# Patient Record
Sex: FEMALE | Race: WHITE
[De-identification: names, ages, dates, MRNs, and addresses within clinical notes are randomized per-mention and may not be internally consistent; named-entity substitution may affect disease eponyms.]

---

## 2018-07-12 ENCOUNTER — HOSPITAL ENCOUNTER (EMERGENCY)
Dept: HOSPITAL 97 - ER | Age: 43
Discharge: HOME | End: 2018-07-12
Payer: SELF-PAY

## 2018-07-12 DIAGNOSIS — R10.32: Primary | ICD-10-CM

## 2018-07-12 DIAGNOSIS — E11.9: ICD-10-CM

## 2018-07-12 DIAGNOSIS — E78.5: ICD-10-CM

## 2018-07-12 DIAGNOSIS — Z79.84: ICD-10-CM

## 2018-07-12 DIAGNOSIS — I10: ICD-10-CM

## 2018-07-12 LAB — UA COMPLETE W REFLEX CULTURE PNL UR: (no result)

## 2018-07-12 PROCEDURE — 87086 URINE CULTURE/COLONY COUNT: CPT

## 2018-07-12 PROCEDURE — 96372 THER/PROPH/DIAG INJ SC/IM: CPT

## 2018-07-12 PROCEDURE — 81003 URINALYSIS AUTO W/O SCOPE: CPT

## 2018-07-12 PROCEDURE — 81025 URINE PREGNANCY TEST: CPT

## 2018-07-12 PROCEDURE — 81015 MICROSCOPIC EXAM OF URINE: CPT

## 2018-07-12 PROCEDURE — 76830 TRANSVAGINAL US NON-OB: CPT

## 2018-07-12 PROCEDURE — 87088 URINE BACTERIA CULTURE: CPT

## 2018-07-12 PROCEDURE — 99284 EMERGENCY DEPT VISIT MOD MDM: CPT

## 2018-07-12 NOTE — ER
Nurse's Notes                                                                                     

 [facility]                                                                

Name: Ellyn Renee                                                                              

Age: 42 yrs                                                                                       

Sex: Female                                                                                       

: 1975                                                                                   

MRN: [id number]                                                                                   

Arrival Date: 2018                                                                          

Time: 13:46                                                                                       

Account#: [id number]                                                                            

Bed 13                                                                                            

Private MD: None, None                                                                            

Diagnosis: Lower abdominal pain, unspecified                                                      

                                                                                                  

Presentation:                                                                                     

                                                                                             

14:06 Presenting complaint: Patient states: Reports left lower quadrant pain for 2 months.    aj1 

      Patient has not seen her PHCP regarding this complaint. Denies N/V/D. Denies fever,         

      dysuria, vaginal bleeding or discharge. Transition of care: patient was not received        

      from another setting of care. Onset of symptoms was May 2018. Risk Assessment: Do you       

      want to hurt yourself or someone else? Patient reports no desire to harm self or            

      others. Initial Sepsis Screen: Does the patient meet any 2 criteria? No. Patient's          

      initial sepsis screen is negative. Does the patient have a suspected source of              

      infection? No. Patient's initial sepsis screen is negative. Care prior to arrival: None.    

14:06 Method Of Arrival: Ambulatory                                                           [facility] 

14:06 Acuity: ROMAN 3                                                                           aj 

                                                                                                  

Triage Assessment:                                                                                

14:10 General: Appears in no apparent distress. comfortable, Behavior is calm, cooperative,   aj1 

      appropriate for age. Pain: Complains of pain in left lower quadrant Pain currently is 8     

      out of 10 on a pain scale. Neuro: Level of Consciousness is awake, alert, obeys             

      commands. Cardiovascular: Patient's skin is warm and dry. Respiratory: Airway is patent     

      Respiratory effort is even, unlabored, Respiratory pattern is regular, symmetrical. GI:     

      Reports lower abdominal pain, Patient currently denies diarrhea, nausea, vomiting. :      

      Denies burning with urination, discharge, vaginal bleeding. Derm: Skin is pink, warm \T\    

      dry. normal.                                                                                

                                                                                                  

OB/GYN:                                                                                           

14:10 LMP 2018                                                                           aj 

                                                                                                  

Historical:                                                                                       

- Allergies:                                                                                      

14:10 No Known Allergies;                                                                     aj1 

- Home Meds:                                                                                      

14:10 lovastatin 20 mg Oral tab 1 tab once daily [Active]; Prozac 40 mg Oral cap 1 cap once   aj1 

      daily [Active]; metformin 1,000 mg Oral tab 1 tab 2 times per day [Active]; metoprolol      

      tartrate 100 mg Oral tab 1 tab 2 times per day [Active]; amlodipine 5 mg tab 1 tab once     

      daily [Active]; losartan-hydrochlorothiazide 100-12.5 mg oral tab 1 tab once daily          

      [Active];                                                                                   

- PMHx:                                                                                           

14:10 Hyperlipidemia; Hypertension; Diabetes - NIDDM; polycystic ovarian syndrome;            aj1 

- PSHx:                                                                                           

14:10 ;                                                                              aj1 

                                                                                                  

- Immunization history:: Flu vaccine is not up to date.                                           

- Social history:: Smoking status: Patient/guardian denies using tobacco.                         

- Ebola Screening: : Patient denies travel to an Ebola-affected area in the 21 days               

  before illness onset.                                                                           

                                                                                                  

                                                                                                  

Screening:                                                                                        

15:58 Abuse screen: Denies threats or abuse. Nutritional screening: No deficits noted.        tw2 

      Tuberculosis screening: No symptoms or risk factors identified. Fall Risk None              

      identified.                                                                                 

                                                                                                  

Assessment:                                                                                       

15:27 General: Appears in no apparent distress. Behavior is calm, cooperative, appropriate    tw2 

      for age. Pain: Complains of pain in abdomen. Neuro: Level of Consciousness is awake,        

      alert, obeys commands, Oriented to person, place, time, situation. Cardiovascular:          

      Denies chest pain, shortness of breath, Heart tones S1 S2 Patient's skin is warm and        

      dry. Respiratory: Airway is patent Respiratory effort is even, unlabored, Respiratory       

      pattern is regular, symmetrical, Breath sounds are clear bilaterally. GI: Abdomen is        

      round non-distended, Bowel sounds present X 4 quads. Abd is soft X 4 quads. : No          

      signs and/or symptoms were reported regarding the genitourinary system. EENT: No signs      

      and/or symptoms were reported regarding the EENT system. Derm: No signs and/or symptoms     

      reported regarding the dermatologic system. Musculoskeletal: Range of motion: intact in     

      all extremities.                                                                            

15:56 Reassessment: pt in US at this time, not available for vs or medication admin.          tw2 

16:27 Reassessment: Patient appears in no apparent distress at this time. No changes from     tw2 

      previously documented assessment. Patient and/or family updated on plan of care and         

      expected duration. Pain level reassessed. Patient is alert, oriented x 3, equal             

      unlabored respirations, skin warm/dry/pink.                                                 

17:03 Reassessment: Patient appears in no apparent distress at this time. No changes from     tw2 

      previously documented assessment. Patient and/or family updated on plan of care and         

      expected duration. Pain level reassessed. Patient is alert, oriented x 3, equal             

      unlabored respirations, skin warm/dry/pink.                                                 

                                                                                                  

Vital Signs:                                                                                      

14:10  / 103; Pulse 73; Resp 20; Temp 97.4(TE); Pulse Ox 97% on R/A; Weight 90.72 kg    aj1 

      (R); Height 5 ft. 6 in. (167.64 cm); Pain 8/10;                                             

16:23 Pulse 70; Resp 18; Pulse Ox 98% on R/A;                                                 tw2 

16:27  / 94; Pulse 72; Resp 17; Pulse Ox 96% on R/A;                                    tw2 

17:03  / 94; Pulse 69; Resp 17; Pulse Ox 99% on R/A;                                    tw2 

14:10 Body Mass Index 32.28 (90.72 kg, 167.64 cm)                                             aj1 

                                                                                                  

ED Course:                                                                                        

13:46 Patient arrived in ED.                                                                  mr  

13:46 None, None is Private Physician.                                                        mr  

14:08 Triage completed.                                                                       aj1 

14:10 Arm band placed on Patient placed in an internal wait recliner, Patient notified of     aj1 

      wait time.                                                                                  

15:27 Bed in low position. Adult w/ patient. Pulse ox on. NIBP on.                            tw2 

15:28 Ofelia Villanueva, MAIN is Primary Nurse.                                                        tw2 

15:29 Maria Elena Hdz FNP-C is PHCP.                                                        snw 

15:29 Aaron Argueta MD is Attending Physician.                                              snw 

16:11 US Transvaginal Study (Probe) In Process Unspecified.                                   EDMS

16:29 Awaiting: observation at IM medication and update with results from provider prior to   tw2 

      discharge.                                                                                  

16:30 No provider procedures requiring assistance completed. Patient did not have IV access   tw2 

      during this emergency room visit.                                                           

                                                                                                  

Administered Medications:                                                                         

14:15 Drug: TORadol 60 mg Route: IM; Site: left deltoid;                                      tw2 

17:03 Follow up: Response: No adverse reaction                                                tw2 

                                                                                                  

                                                                                                  

Outcome:                                                                                          

16:18 Discharge ordered by MD.                                                                snw 

17:03 Discharged to home ambulatory, with family.                                             tw2 

17:03 Condition: stable                                                                           

17:03 Discharge instructions given to patient, family, Instructed on discharge instructions,      

      follow up and referral plans. no drinking with medication, no driving heavy equipment,      

      medication usage, Demonstrated understanding of instructions, follow-up care,               

      medications, Prescriptions given X 2.                                                       

17:04 Patient left the ED.                                                                    tw2 

                                                                                                  

Signatures:                                                                                       

Dispatcher [facility]                           Luz Bone, RN                     RN   aj1                                                  

Maria Elena Hdz, FNP-C                 FNP-Csnw                                                  

Jolly Keen                                mr                                                   

Ofelia Villanueva, RN                          RN   tw2                                                  

                                                                                                  

**************************************************************************************************

## 2018-07-12 NOTE — EDPHYS
Physician Documentation                                                                           

 [facility]                                                                

Name: Ellyn Renee                                                                              

Age: 42 yrs                                                                                       

Sex: Female                                                                                       

: 1975                                                                                   

MRN: [id number]                                                                                   

Arrival Date: 2018                                                                          

Time: 13:46                                                                                       

Account#: [id number]                                                                            

Bed 13                                                                                            

Private MD: None, None                                                                            

ED Physician Aaron Argueta                                                                       

HPI:                                                                                              

                                                                                             

15:41 This 42 yrs old  Female presents to ER via Ambulatory with complaints of       snw 

      Abdominal Pain.                                                                             

15:41 The patient presents with abdominal pain in the left lower quadrant. Onset: The         snw 

      symptoms/episode began/occurred gradually, 2 month(s) ago, and became persistent. The       

      symptoms radiate to left groin. Associated signs and symptoms: none. The symptoms are       

      described as sharp. Severity of pain: At its worst the pain was moderate. It is unknown     

      whether or not the patient has had similar symptoms in the past. The patient has not        

      recently seen a physician, called but was unable to secure a timely appointment for an      

      office visit, states she has an appt next month.                                            

                                                                                                  

OB/GYN:                                                                                           

14:10 LMP 2018                                                                           aj1 

                                                                                                  

Historical:                                                                                       

- Allergies:                                                                                      

14:10 No Known Allergies;                                                                     aj1 

- Home Meds:                                                                                      

14:10 lovastatin 20 mg Oral tab 1 tab once daily [Active]; Prozac 40 mg Oral cap 1 cap once   aj1 

      daily [Active]; metformin 1,000 mg Oral tab 1 tab 2 times per day [Active]; metoprolol      

      tartrate 100 mg Oral tab 1 tab 2 times per day [Active]; amlodipine 5 mg tab 1 tab once     

      daily [Active]; losartan-hydrochlorothiazide 100-12.5 mg oral tab 1 tab once daily          

      [Active];                                                                                   

- PMHx:                                                                                           

14:10 Hyperlipidemia; Hypertension; Diabetes - NIDDM; polycystic ovarian syndrome;            aj1 

- PSHx:                                                                                           

14:10 ;                                                                              aj1 

                                                                                                  

- Immunization history:: Flu vaccine is not up to date.                                           

- Social history:: Smoking status: Patient/guardian denies using tobacco.                         

- Ebola Screening: : Patient denies travel to an Ebola-affected area in the 21 days               

  before illness onset.                                                                           

                                                                                                  

                                                                                                  

ROS:                                                                                              

15:40 Constitutional: Negative for fever, chills, and weight loss, Eyes: Negative for injury, snw 

      pain, redness, and discharge, ENT: Negative for injury, pain, and discharge, Neck:          

      Negative for injury, pain, and swelling, Cardiovascular: Negative for chest pain,           

      palpitations, and edema, Respiratory: Negative for shortness of breath, cough,              

      wheezing, and pleuritic chest pain, Abdomen/GI: Negative for nausea, vomiting,              

      diarrhea, and constipation, + abdominal pain x 2 months Back: Negative for injury and       

      pain, : Negative for injury, bleeding, discharge, and swelling, MS/Extremity:             

      Negative for injury and deformity, Skin: Negative for injury, rash, and discoloration,      

      Neuro: Negative for headache, weakness, numbness, tingling, and seizure.                    

                                                                                                  

Exam:                                                                                             

15:38 Constitutional:  This is a well developed, well nourished patient who is awake, alert,  snw 

      and in no acute distress. Head/Face:  Normocephalic, atraumatic. Eyes:  Pupils equal        

      round and reactive to light, extra-ocular motions intact.  Lids and lashes normal.          

      Conjunctiva and sclera are non-icteric and not injected.  Cornea within normal limits.      

      Periorbital areas with no swelling, redness, or edema. ENT:  Nares patent. No nasal         

      discharge, no septal abnormalities noted.  Tympanic membranes are normal and external       

      auditory canals are clear.  Oropharynx with no redness, swelling, or masses, exudates,      

      or evidence of obstruction, uvula midline.  Mucous membranes moist. Neck:  Trachea          

      midline, no thyromegaly or masses palpated, and no cervical lymphadenopathy.  Supple,       

      full range of motion without nuchal rigidity, or vertebral point tenderness.  No            

      Meningismus. Chest/axilla:  Normal chest wall appearance and motion.  Nontender with no     

      deformity.  No lesions are appreciated. Cardiovascular:  Regular rate and rhythm with a     

      normal S1 and S2.  No gallops, murmurs, or rubs.  Normal PMI, no JVD.  No pulse             

      deficits. Respiratory:  Lungs have equal breath sounds bilaterally, clear to                

      auscultation and percussion.  No rales, rhonchi or wheezes noted.  No increased work of     

      breathing, no retractions or nasal flaring. Back:  No spinal tenderness.  No                

      costovertebral tenderness.  Full range of motion. Skin:  Warm, dry with normal turgor.      

      Normal color with no rashes, no lesions, and no evidence of cellulitis. MS/ Extremity:      

      Pulses equal, no cyanosis.  Neurovascular intact.  Full, normal range of motion. Neuro:     

       Awake and alert, GCS 15, oriented to person, place, time, and situation.  Cranial          

      nerves II-XII grossly intact.  Motor strength 5/5 in all extremities.  Sensory grossly      

      intact.  Cerebellar exam normal.  Normal gait.                                              

15:38 Abdomen/GI: Inspection: abdomen appears normal, Bowel sounds: normal, Palpation: mild       

      abdominal tenderness, in the left groin.                                                    

                                                                                                  

Vital Signs:                                                                                      

14:10  / 103; Pulse 73; Resp 20; Temp 97.4(TE); Pulse Ox 97% on R/A; Weight 90.72 kg    aj1 

      (R); Height 5 ft. 6 in. (167.64 cm); Pain 8/10;                                             

16:23 Pulse 70; Resp 18; Pulse Ox 98% on R/A;                                                 tw2 

16:27  / 94; Pulse 72; Resp 17; Pulse Ox 96% on R/A;                                    tw2 

17:03  / 94; Pulse 69; Resp 17; Pulse Ox 99% on R/A;                                    tw2 

14:10 Body Mass Index 32.28 (90.72 kg, 167.64 cm)                                             aj1 

                                                                                                  

MDM:                                                                                              

15:29 Patient medically screened.                                                             snw 

20:22 Data reviewed: vital signs, nurses notes. Data interpreted: Pulse oximetry: on room air snw 

      is 99 %. Interpretation: normal. Counseling: I had a detailed discussion with the           

      patient and/or guardian regarding: the historical points, exam findings, and any            

      diagnostic results supporting the discharge/admit diagnosis, the presence of at least       

      one elevated blood pressure reading (>120/80) during this emergency department visit,       

      lab results, the need for outpatient follow up, for definitive care, to return to the       

      emergency department if symptoms worsen or persist or if there are any questions or         

      concerns that arise at home. Special discussion: Based on the patient's Hx, exam, and       

      Dx evaluation, there is no indication for emergent surgery or inpatient Tx. It is           

      understood by the patient/guardian that if the Sx's persist or worsen they need to          

      return immediately for re-evaluation. Based on the history and exam findings, there is      

      no indication for further emergent testing or inpatient evaluation. I discussed with        

      the patient/guardian the need to see the primary care provider for further evaluation       

      of the symptoms.                                                                            

                                                                                                  

                                                                                             

15:38 Order name: Urine Culture                                                               snw 

                                                                                             

15:38 Order name: Urine Microscopic Only; Complete Time: 17:31                                snw 

                                                                                             

15:38 Order name: US Transvaginal Study (Probe)                                               snw 

                                                                                             

15:51 Order name: Urine Dipstick--Ancillary (enter results); Complete Time: 15:58             ag  

                                                                                             

15:51 Order name: Urine Pregnancy--Ancillary (enter results); Complete Time: 15:58            ag  

                                                                                             

15:38 Order name: Urine Dipstick-Ancillary (obtain specimen); Complete Time: 16:23            snw 

                                                                                                  

Administered Medications:                                                                         

14:15 Drug: TORadol 60 mg Route: IM; Site: left deltoid;                                      tw2 

17:03 Follow up: Response: No adverse reaction                                                tw2 

                                                                                                  

                                                                                                  

Disposition:                                                                                      

                                                                                             

14:13 Co-signature as Attending Physician, Aaron Argueta MD I agree with the assessment and   kdr 

      plan of care.                                                                               

                                                                                                  

Disposition:                                                                                      

18 16:18 Discharged to Home. Impression: Lower abdominal pain, unspecified.                 

- Condition is Stable.                                                                            

- Discharge Instructions: Hypertension, Pain Without a Known Cause, Abdominal Pain,               

  Women.                                                                                          

- Prescriptions for Bentyl 20 mg Oral Tablet - take 1 tablet by ORAL route every 6                

  hours As needed; 20 tablet. Diclofenac Sodium 75 mg Oral Tablet Sustained Release -             

  take 1 tablet by ORAL route 2 times per day; 30 tablet.                                         

- Work release form, Medication Reconciliation Form, Thank You Letter, Antibiotic                 

  Education, Prescription Opioid Use form.                                                        

- Follow up: Private Physician; When: 2 - 3 days; Reason: Recheck today's complaints,             

  Continuance of care, Re-evaluation by your physician. Follow up: Emergency                      

  Department; When: As needed; Reason: Worsening of condition.                                    

                                                                                                  

                                                                                                  

                                                                                                  

Signatures:                                                                                       

Dispatcher MedProv[facility] City HospitalLuz Beard RN                     RN   aj1                                                  

Aaron Argueta MD MD   [facility]                                                  

Maria Elena Hdz, FNP-C                 FNP-Csnw                                                  

Ofelia Villanueva RN                          RN   tw2                                                  

                                                                                                  

Corrections: (The following items were deleted from the chart)                                    

                                                                                             

17:04 16:18 2018 16:18 Discharged to Home. Impression: Lower abdominal pain,            tw2 

      unspecified. Condition is Stable. Forms are Medication Reconciliation Form, Thank You       

      Letter, Antibiotic Education, Prescription Opioid Use. Follow up: Private Physician;        

      When: 2 - 3 days; Reason: Recheck today's complaints, Continuance of care,                  

      Re-evaluation by your physician. Follow up: Emergency Department; When: As needed;          

      Reason: Worsening of condition. snw                                                         

                                                                                                  

**************************************************************************************************

## 2018-07-12 NOTE — RAD REPORT
EXAM DESCRIPTION:  US - Transvaginal Study Probe - 7/12/2018 4:10 pm

 

CLINICAL HISTORY:  left groin pain

Pelvic pain.

 

COMPARISON:  TRANSVAGINAL STUDY PROBE dated 3/10/2009

 

FINDINGS:  The uterus is normal in size, shape and echotexture. The uterus measures 7.3 x 5.6 x 4.6 c
m.

 

The endometrial stripe measures 6 mm, normal.

 

Both ovaries are normal in size, shape and echotexture.  The right ovary measures 4.4 x 3.5 x 2.3 cm.
  The left ovary measures 3.8 x 2.6 x 2.5 cm. No ovarian or parovarian lesions. No adnexal masses.

 

Normal Doppler blood flow was demonstrated to both ovaries.

 

No significant pelvic ascites.

 

IMPRESSION:  Unremarkable study.